# Patient Record
Sex: FEMALE | Race: BLACK OR AFRICAN AMERICAN | Employment: UNEMPLOYED | URBAN - METROPOLITAN AREA
[De-identification: names, ages, dates, MRNs, and addresses within clinical notes are randomized per-mention and may not be internally consistent; named-entity substitution may affect disease eponyms.]

---
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
## 2020-04-13 ENCOUNTER — HOSPITAL ENCOUNTER (EMERGENCY)
Age: 20
Discharge: HOME | End: 2020-04-13
Attending: EMERGENCY MEDICINE
Payer: MEDICAID

## 2020-04-13 VITALS
WEIGHT: 134 LBS | SYSTOLIC BLOOD PRESSURE: 106 MMHG | DIASTOLIC BLOOD PRESSURE: 74 MMHG | BODY MASS INDEX: 23.74 KG/M2 | TEMPERATURE: 98.4 F | HEART RATE: 101 BPM | RESPIRATION RATE: 16 BRPM | HEIGHT: 63 IN | OXYGEN SATURATION: 99 %

## 2020-04-13 DIAGNOSIS — K04.7 DENTAL ABSCESS: Primary | ICD-10-CM

## 2020-04-13 DIAGNOSIS — L03.211 FACIAL CELLULITIS: ICD-10-CM

## 2020-04-13 LAB — HCG UR QL: NEGATIVE

## 2020-04-13 PROCEDURE — 99282 EMERGENCY DEPT VISIT SF MDM: CPT

## 2020-04-13 PROCEDURE — 81025 URINE PREGNANCY TEST: CPT

## 2020-04-13 PROCEDURE — 75810000289 HC I&D ABSCESS SIMP/COMP/MULT

## 2020-04-13 RX ORDER — IBUPROFEN 800 MG/1
800 TABLET ORAL EVERY 8 HOURS
Qty: 15 TAB | Refills: 0 | Status: SHIPPED | OUTPATIENT
Start: 2020-04-13 | End: 2020-04-18

## 2020-04-13 RX ORDER — LIDOCAINE HYDROCHLORIDE 20 MG/ML
5 SOLUTION ORAL; TOPICAL
Qty: 200 ML | Refills: 0 | Status: SHIPPED | OUTPATIENT
Start: 2020-04-13

## 2020-04-13 RX ORDER — CLINDAMYCIN HYDROCHLORIDE 300 MG/1
300 CAPSULE ORAL 4 TIMES DAILY
Qty: 40 CAP | Refills: 0 | Status: SHIPPED | OUTPATIENT
Start: 2020-04-13 | End: 2020-04-23

## 2020-04-13 RX ORDER — PENICILLIN V POTASSIUM 500 MG/1
500 TABLET, FILM COATED ORAL 4 TIMES DAILY
Qty: 40 TAB | Refills: 0 | Status: SHIPPED | OUTPATIENT
Start: 2020-04-13 | End: 2020-04-23

## 2020-04-13 RX ORDER — CHLORHEXIDINE GLUCONATE 1.2 MG/ML
15 RINSE ORAL EVERY 12 HOURS
Qty: 420 ML | Refills: 0 | Status: SHIPPED | OUTPATIENT
Start: 2020-04-13 | End: 2020-04-27

## 2020-04-13 RX ORDER — OXYCODONE AND ACETAMINOPHEN 5; 325 MG/1; MG/1
1 TABLET ORAL
Qty: 10 TAB | Refills: 0 | Status: SHIPPED | OUTPATIENT
Start: 2020-04-13 | End: 2020-04-16

## 2020-04-13 NOTE — ED TRIAGE NOTES
Patient arrived via triage stating that her upper right jaw has been hurting for awhile Patient attempted to remove a cracked tooth by herself but was unable to get all of her tooth out. Patient's right side of her face is now swollen and tender to touch. Patient has taken over the counter pain reliever.

## 2020-04-13 NOTE — ED PROVIDER NOTES
EMERGENCY DEPARTMENT HISTORY AND PHYSICAL EXAM    Date: 4/13/2020  Patient Name: Rey Payne    History of Presenting Illness     Chief Complaint   Patient presents with    Dental Pain    Facial Swelling         History Provided By: Patient    Additional History (Context): Rey Payne is a 23 y.o. female with No significant past medical history who presents with upper right dental pain and facial swelling since yesterday. Denies fever drooling trismus. Does not go to see a dentist.  She is unsure if she is pregnant or not. PCP: None    Current Outpatient Medications   Medication Sig Dispense Refill    penicillin v potassium (VEETID) 500 mg tablet Take 1 Tab by mouth four (4) times daily for 10 days. 40 Tab 0    clindamycin (CLEOCIN) 300 mg capsule Take 1 Cap by mouth four (4) times daily for 10 days. 40 Cap 0    ibuprofen (MOTRIN) 800 mg tablet Take 1 Tab by mouth every eight (8) hours for 5 days. 15 Tab 0    Lidocaine Viscous 2 % solution Take 5 mL by mouth two (2) times daily as needed for Pain. Put 5ml onto guaze and put in mouth; hold for 30min. Can repeat up to twice daily. Indications: administration of local anesthetic drug 200 mL 0    oxyCODONE-acetaminophen (Percocet) 5-325 mg per tablet Take 1 Tab by mouth every six (6) hours as needed for Pain for up to 3 days. Max Daily Amount: 4 Tabs. Take 1 tablet every 6 hours as needed for pain control. 10 Tab 0    chlorhexidine (Peridex) 0.12 % solution 15 mL by Swish and Spit route every twelve (12) hours for 14 days. 420 mL 0    metroNIDAZOLE (FLAGYL) 250 mg tablet Sig:  Take 2 gram by mouth x 1 dose only. 8 Tab 0    ibuprofen (MOTRIN) 600 mg tablet Take 1 Tab by mouth every six (6) hours as needed for Pain. 20 Tab 0    risperiDONE (RISPERDAL) 4 mg tablet Take 5 mg by mouth two (2) times a day.  Indications: BIPOLAR DISORDER IN REMISSION, SCHIZOPHRENIA         Past History     Past Medical History:  Past Medical History:   Diagnosis Date    ADHD (attention deficit hyperactivity disorder)     Anxiety and depression     B[facility] 1 [facility] [address]     Chlamydia 2016    Treated    [facility]        Past Surgical History:  No past surgical history on file. Family History:  Family History   Problem Relation Age of Onset   Brown Stroke Mother     Hypertension Mother     Headache Mother     Seizures Mother     Stroke Maternal Grandmother     Hypertension Maternal Grandmother     Hypertension Maternal Grandfather     Stroke Maternal Grandfather     Stroke Father        Social History:  Social History     Tobacco Use    Smoking status: Current Every Day Smoker    Smokeless tobacco: Never Used   Substance Use Topics    Alcohol use: No    Drug use: No       Allergies: Allergies   Allergen Reactions    Avocado Angioedema         Review of Systems   Review of Systems   Constitutional: Negative for fever. HENT: Positive for dental problem and facial swelling. Negative for drooling. All Other Systems Negative  Physical Exam     Vitals:    04/13/20 1643   BP: 106/74   Pulse: (!) 101   Resp: 16   Temp: 98.4 °F (36.9 °C)   SpO2: 99%   Weight: 60.8 kg (134 lb)   Height: 5' 3\" (1.6 m)     Physical Exam  Vitals signs and nursing note reviewed. Constitutional:       Appearance: She is well-developed. HENT:      Head: Normocephalic and atraumatic. Ears:      Comments: Dental: Second premolar on upper right is eroded tender with adjacent gingival fluctuance palpated and visualized. Moderate to significant facial swelling on the right side. Drooling or trismus. Eyes:      Pupils: Pupils are equal, round, and reactive to light. Neck:      Thyroid: No thyromegaly. Vascular: No JVD. Trachea: No tracheal deviation. Cardiovascular:      Rate and Rhythm: Normal rate and regular rhythm. Heart sounds: Normal heart sounds. No murmur. No friction rub. No gallop.     Pulmonary:      Effort: Pulmonary effort is normal. No respiratory distress. Breath sounds: Normal breath sounds. No stridor. No wheezing or rales. Chest:      Chest wall: No tenderness. Abdominal:      General: There is no distension. Palpations: Abdomen is soft. There is no mass. Tenderness: There is no abdominal tenderness. There is no guarding or rebound. Musculoskeletal:         General: No tenderness. Lymphadenopathy:      Cervical: No cervical adenopathy. Skin:     General: Skin is warm and dry. Coloration: Skin is not pale. Findings: No erythema or rash. Neurological:      Mental Status: She is alert and oriented to person, place, and time. Psychiatric:         Behavior: Behavior normal.         Thought Content: Thought content normal.            Diagnostic Study Results     Labs -     Recent Results (from the past 12 hour(s))   HCG URINE, QL    Collection Time: 04/13/20  4:50 PM   Result Value Ref Range    HCG urine, QL Negative NEG         Radiologic Studies -   No orders to display     CT Results  (Last 48 hours)    None        CXR Results  (Last 48 hours)    None            Medical Decision Making   I am the first provider for this patient. I reviewed the vital signs, available nursing notes, past medical history, past surgical history, family history and social history. Vital Signs-Reviewed the patient's vital signs. Procedures:  I&D Abcess Simple  Date/Time: 4/13/2020 5:08 PM  Performed by: MELITA Lanier  Authorized by: MELITA Lanier     Consent:     Consent obtained:  Verbal    Consent given by:  Patient    Risks discussed:  Pain and incomplete drainage    Alternatives discussed:  Referral  Location:     Type:  Abscess    Size:  1c    Location:  Mouth    Mouth location:  Alveolar process  Anesthesia (see MAR for exact dosages):      Anesthesia method:  None  Procedure type:     Complexity:  Simple  Procedure details:     Incision types:  Stab incision    Drainage:  Purulent and bloody    Drainage amount: Moderate    Packing materials:  None  Post-procedure details:     Patient tolerance of procedure: Tolerated well, no immediate complications        Provider Notes (Medical Decision Making): I&D performed; not pregnant. Treat dental abscess. MED RECONCILIATION:  No current facility-administered medications for this encounter. Current Outpatient Medications   Medication Sig    penicillin v potassium (VEETID) 500 mg tablet Take 1 Tab by mouth four (4) times daily for 10 days.  clindamycin (CLEOCIN) 300 mg capsule Take 1 Cap by mouth four (4) times daily for 10 days.  ibuprofen (MOTRIN) 800 mg tablet Take 1 Tab by mouth every eight (8) hours for 5 days.  Lidocaine Viscous 2 % solution Take 5 mL by mouth two (2) times daily as needed for Pain. Put 5ml onto guaze and put in mouth; hold for 30min. Can repeat up to twice daily. Indications: administration of local anesthetic drug    oxyCODONE-acetaminophen (Percocet) 5-325 mg per tablet Take 1 Tab by mouth every six (6) hours as needed for Pain for up to 3 days. Max Daily Amount: 4 Tabs. Take 1 tablet every 6 hours as needed for pain control.  chlorhexidine (Peridex) 0.12 % solution 15 mL by Swish and Spit route every twelve (12) hours for 14 days.  metroNIDAZOLE (FLAGYL) 250 mg tablet Sig:  Take 2 gram by mouth x 1 dose only.  ibuprofen (MOTRIN) 600 mg tablet Take 1 Tab by mouth every six (6) hours as needed for Pain.  risperiDONE (RISPERDAL) 4 mg tablet Take 5 mg by mouth two (2) times a day. Indications: BIPOLAR DISORDER IN REMISSION, SCHIZOPHRENIA       Disposition:  home    DISCHARGE NOTE:   5:18 PM    Pt has been reexamined. Patient has no new complaints, changes, or physical findings. Care plan outlined and precautions discussed. Results of exam, labs were reviewed with the patient. All medications were reviewed with the patient; will d/c home with see below. All of pt's questions and concerns were addressed.  Patient was instructed and agrees to follow up with dental, as well as to return to the ED upon further deterioration. Patient is ready to go home. Follow-up Information     Follow up With Specialties Details Why [facility]  Schedule an appointment as soon as possible for a visit in 2 days  [address] [facility]  [address] [facility]  Schedule an appointment as soon as possible for a visit in 2 days  [address] [address] [phone]    [facility] EMERGENCY DEPT Emergency Medicine  If symptoms worsen return immediately [address]  [phone]          Current Discharge Medication List      START taking these medications    Details   penicillin v potassium (VEETID) 500 mg tablet Take 1 Tab by mouth four (4) times daily for 10 days. Qty: 40 Tab, Refills: 0      clindamycin (CLEOCIN) 300 mg capsule Take 1 Cap by mouth four (4) times daily for 10 days. Qty: 40 Cap, Refills: 0      !! ibuprofen (MOTRIN) 800 mg tablet Take 1 Tab by mouth every eight (8) hours for 5 days. Qty: 15 Tab, Refills: 0      Lidocaine Viscous 2 % solution Take 5 mL by mouth two (2) times daily as needed for Pain. Put 5ml onto guaze and put in mouth; hold for 30min. Can repeat up to twice daily. Indications: administration of local anesthetic drug  Qty: 200 mL, Refills: 0      oxyCODONE-acetaminophen (Percocet) 5-325 mg per tablet Take 1 Tab by mouth every six (6) hours as needed for Pain for up to 3 days. Max Daily Amount: 4 Tabs. Take 1 tablet every 6 hours as needed for pain control. Qty: 10 Tab, Refills: 0    Associated Diagnoses: Dental abscess      chlorhexidine (Peridex) 0.12 % solution 15 mL by Swish and Spit route every twelve (12) hours for 14 days. Qty: 420 mL, Refills: 0       !! - Potential duplicate medications found. Please discuss with provider.       CONTINUE these medications which have NOT CHANGED Details   !! ibuprofen (MOTRIN) 600 mg tablet Take 1 Tab by mouth every six (6) hours as needed for Pain. Qty: 20 Tab, Refills: 0       !! - Potential duplicate medications found. Please discuss with provider. Clinical Impression:   1. Dental abscess    2.  Facial cellulitis

## 2020-04-13 NOTE — DISCHARGE INSTRUCTIONS
Patient Education        Abscessed Tooth: Care Instructions  Your Care Instructions    An abscessed tooth is a tooth that has a pocket of pus in the tissues around it. Pus forms when the body tries to fight an infection caused by bacteria. If the pus cannot drain, it forms an abscess. An abscessed tooth can cause red, swollen gums and throbbing pain, especially when you chew. You may have a bad taste in your mouth and a fever, and your jaw may swell. Damage to the tooth, untreated tooth decay, or gum disease can cause an abscessed tooth. An abscessed tooth needs to be treated by a dental professional right away. If it is not treated, the infection could spread to other parts of your body. Your dentist will give you antibiotics to stop the infection. He or she may make a hole in the tooth or cut open (joe) the abscess inside your mouth so that the infection can drain, which should relieve your pain. You may need to have a root canal treatment, which tries to save your tooth by taking out the infected pulp and replacing it with a healing medicine and/or a filling. If these treatments do not work, your tooth may have to be removed. Follow-up care is a key part of your treatment and safety. Be sure to make and go to all appointments, and call your doctor if you are having problems. It's also a good idea to know your test results and keep a list of the medicines you take. How can you care for yourself at home? · Reduce pain and swelling in your face and jaw by putting ice or a cold pack on the outside of your cheek. Do this for 10 to 20 minutes at a time. Put a thin cloth between the ice and your skin. · Take pain medicines exactly as directed. ? If the doctor gave you a prescription medicine for pain, take it as prescribed. ? If you are not taking a prescription pain medicine, ask your doctor if you can take an over-the-counter medicine. · Take antibiotics as directed.  Do not stop taking them just because you feel better. You need to take the full course of antibiotics. To prevent tooth abscess  · Brush and floss every day. Have regular dental checkups. · Eat a healthy diet. Avoid sugary foods and drinks. · Do not smoke or vape with nicotine. And don't use spit tobacco. Tobacco and nicotine slow your ability to heal. They increase your risk for gum disease and cancer of the mouth and throat. If you need help quitting, talk to your doctor about stop-smoking programs and medicines. These can increase your chances of quitting for good. When should you call for help? Call 911 anytime you think you may need emergency care. For example, call if:    · You have trouble breathing.    Call your doctor now or seek immediate medical care if:    · You have new or worse symptoms of infection, such as:  ? Increased pain, swelling, warmth, or redness. ? Red streaks leading from the area. ? Pus draining from the area. ? A fever.    Watch closely for changes in your health, and be sure to contact your doctor if:    · You do not get better as expected. Where can you learn more? Go to http://helder-heath.info/  Enter L466 in the search box to learn more about \"Abscessed Tooth: Care Instructions. \"  Current as of: July 28, 2019Content Version: 12.4  © 3626-3381 [employer]. Care instructions adapted under license by [employer] (which disclaims liability or warranty for this information). If you have questions about a medical condition or this instruction, always ask your healthcare professional. [place] [address] any warranty or liability for your use of this information.

## 2021-05-03 ENCOUNTER — HOSPITAL ENCOUNTER (EMERGENCY)
Age: 21
Discharge: HOME | End: 2021-05-03
Attending: EMERGENCY MEDICINE
Payer: COMMERCIAL

## 2021-05-03 VITALS
HEART RATE: 69 BPM | DIASTOLIC BLOOD PRESSURE: 72 MMHG | SYSTOLIC BLOOD PRESSURE: 117 MMHG | TEMPERATURE: 97.8 F | RESPIRATION RATE: 14 BRPM | OXYGEN SATURATION: 99 %

## 2021-05-03 DIAGNOSIS — R10.2 PELVIC PAIN: ICD-10-CM

## 2021-05-03 DIAGNOSIS — K08.89 PAIN, DENTAL: Primary | ICD-10-CM

## 2021-05-03 LAB
AMORPH CRY URNS QL MICRO: ABNORMAL
APPEARANCE UR: ABNORMAL
BACTERIA URNS QL MICRO: ABNORMAL /HPF
BILIRUB UR QL: NEGATIVE
COLOR UR: YELLOW
EPITH CASTS URNS QL MICRO: ABNORMAL /LPF (ref 0–5)
GLUCOSE UR STRIP.AUTO-MCNC: NEGATIVE MG/DL
HCG UR QL: NEGATIVE
HGB UR QL STRIP: ABNORMAL
KETONES UR QL STRIP.AUTO: ABNORMAL MG/DL
LEUKOCYTE ESTERASE UR QL STRIP.AUTO: ABNORMAL
MUCOUS THREADS URNS QL MICRO: ABNORMAL /LPF
NITRITE UR QL STRIP.AUTO: NEGATIVE
PH UR STRIP: 7 [PH] (ref 5–8)
PROT UR STRIP-MCNC: 30 MG/DL
RBC #/AREA URNS HPF: ABNORMAL /HPF (ref 0–5)
SERVICE CMNT-IMP: NORMAL
SP GR UR REFRACTOMETRY: 1.02 (ref 1–1.03)
UROBILINOGEN UR QL STRIP.AUTO: 0.2 EU/DL (ref 0.2–1)
WBC URNS QL MICRO: ABNORMAL /HPF (ref 0–4)
WET PREP GENITAL: NORMAL

## 2021-05-03 PROCEDURE — 87491 CHLMYD TRACH DNA AMP PROBE: CPT

## 2021-05-03 PROCEDURE — 81025 URINE PREGNANCY TEST: CPT

## 2021-05-03 PROCEDURE — 87210 SMEAR WET MOUNT SALINE/INK: CPT

## 2021-05-03 PROCEDURE — 74011250637 HC RX REV CODE- 250/637: Performed by: EMERGENCY MEDICINE

## 2021-05-03 PROCEDURE — 74011250636 HC RX REV CODE- 250/636: Performed by: EMERGENCY MEDICINE

## 2021-05-03 PROCEDURE — 81001 URINALYSIS AUTO W/SCOPE: CPT

## 2021-05-03 PROCEDURE — 96372 THER/PROPH/DIAG INJ SC/IM: CPT

## 2021-05-03 PROCEDURE — 99285 EMERGENCY DEPT VISIT HI MDM: CPT

## 2021-05-03 RX ORDER — DOXYCYCLINE HYCLATE 100 MG
100 TABLET ORAL 2 TIMES DAILY
Qty: 14 TAB | Refills: 0 | Status: SHIPPED | OUTPATIENT
Start: 2021-05-03 | End: 2021-05-10

## 2021-05-03 RX ORDER — DOXYCYCLINE 100 MG/1
100 CAPSULE ORAL
Status: COMPLETED | OUTPATIENT
Start: 2021-05-03 | End: 2021-05-03

## 2021-05-03 RX ORDER — KETOROLAC TROMETHAMINE 15 MG/ML
15 INJECTION, SOLUTION INTRAMUSCULAR; INTRAVENOUS
Status: COMPLETED | OUTPATIENT
Start: 2021-05-03 | End: 2021-05-03

## 2021-05-03 RX ORDER — IBUPROFEN 800 MG/1
800 TABLET ORAL
Qty: 20 TAB | Refills: 0 | Status: SHIPPED | OUTPATIENT
Start: 2021-05-03

## 2021-05-03 RX ADMIN — KETOROLAC TROMETHAMINE 15 MG: 15 INJECTION, SOLUTION INTRAMUSCULAR; INTRAVENOUS at 06:57

## 2021-05-03 RX ADMIN — DOXYCYCLINE HYCLATE 100 MG: 100 CAPSULE ORAL at 07:47

## 2021-05-03 NOTE — LETTER
5/26/2021 Ms. Richiejenny Jenkins [address] [place] [id number] Dear Ms. Jin: 
 
The results of your lab work performed in the ER were abnormal and we have had difficulty reaching you by telephone. Please contact our office as soon as possible to discuss these results.  
 
 
 
Sincerely, 
 
 
MELISSA Trujillo

## 2021-05-03 NOTE — ED NOTES
Provided pt with discharge paperwork and prescriptions. Advised pt of reading material that was given with discharge paperwork. Encouraged pt to stay away from spicy foods and drink plenty of clear fluids. Encouraged patient not to do any recreational drugs or to drink any alcohol. Pt verbally acknowledged understanding of discharge instructions. Pt had no further questions or concerns.

## 2021-05-03 NOTE — ED NOTES
Pt was in bed sleeping. Informed pt that the doctor ordered her some antibiotics and then she will be discharged. Pt is now crying and stating that her head is hurting and that we have done nothing to help her head. I informed the pt that I gave her a shot for her headache earlier. Pt is still stating that her head is hurting and \"it feels like there is a heart beat in my head. \"

## 2021-05-03 NOTE — ED TRIAGE NOTES
Pt arrived with complaint of dental and ear pain that has been going for the past week. Pt is also complaining of abdominal pain that has been for about two weeks.

## 2021-05-03 NOTE — ED PROVIDER NOTES
[facility]  EMERGENCY DEPARTMENT HISTORY AND PHYSICAL EXAM       Date: 5/3/2021   Patient Name: Imelda Schmidt   YOB: 2000  Medical Record Number: [id number]    HISTORY OF PRESENTING ILLNESS:     Imelda Schmidt is a 21 y.o. female presenting with the noted PMH to the ED c/o right upper dental pain for the past 2 days. Worsening tonight. No fevers or chills or difficulty swallowing or shortness of breath. Also reporting lower abdominal pain for the past several days. Last period was about 2 weeks ago when she states she had few days of spotting recently but none currently. Reports white vaginal discharge. Ports pain is lower bilateral and crampy. Does not think she is pregnant. No nausea or vomiting    Rest of complete systems reviewed and negative    Primary Care Provider: None   Specialist:    Past Medical History:   Past Medical History:   Diagnosis Date    ADHD (attention deficit hyperactivity disorder)     Anxiety and depression     [facility] [address]     Chlamydia 2016    Treated    Schizophrenia ([facility]         Past Surgical History:   No past surgical history on file. Social History:   Social History     Tobacco Use    Smoking status: Current Every Day Smoker    Smokeless tobacco: Never Used   Substance Use Topics    Alcohol use: Yes    Drug use: No        Allergies: Allergies   Allergen Reactions    Avocado Angioedema        REVIEW OF SYSTEMS:  Review of Systems   Constitutional: Negative for chills and fever. HENT: Positive for dental problem. Gastrointestinal: Positive for abdominal pain. Negative for nausea and vomiting. Genitourinary: Positive for vaginal discharge. Negative for dysuria. All other systems reviewed and are negative.         PHYSICAL EXAM:  Vitals:    05/03/21 0545 05/03/21 0600 05/03/21 0615 05/03/21 0630   BP: 133/76 (!) 96/54 130/88 (!) 93/58   Pulse: 84 74 100 65   Resp: 12 18 25 18   Temp: 97.8 °F (36.6 °C) SpO2: 100% 100% 100% 100%       Physical Exam  Vitals signs and nursing note reviewed. Constitutional:       General: She is not in acute distress. Appearance: She is well-developed. HENT:      Head: Normocephalic. Mouth/Throat:      Comments: No trismus, right upper dental pain without fluctuance or abscess or swelling  Eyes:      Conjunctiva/sclera: Conjunctivae normal.   Neck:      Musculoskeletal: Neck supple. Cardiovascular:      Rate and Rhythm: Normal rate. Pulses: Normal pulses. Pulmonary:      Effort: Pulmonary effort is normal.   Abdominal:      General: There is no distension. Palpations: Abdomen is soft. Tenderness: There is no abdominal tenderness. Comments: No significant abdominal tenderness   Genitourinary:     Comments: Mild white discharge with mild cervical motion tenderness, no bleeding, chaperoned by RN  Musculoskeletal: Normal range of motion. Skin:     General: Skin is warm and dry. Neurological:      Mental Status: She is alert and oriented to person, place, and time. Mental status is at baseline.             Medications   cefTRIAXone (ROCEPHIN) 500 mg in lidocaine (PF) (XYLOCAINE) 10 mg/mL (1 %) IM injection (has no administration in time range)   doxycycline (VIBRAMYCIN) capsule 100 mg (has no administration in time range)   ketorolac (TORADOL) injection 15 mg (15 mg IntraMUSCular Given 5/3/21 0657)       RESULTS:    Labs -   Labs Reviewed   URINALYSIS W/ RFLX MICROSCOPIC - Abnormal; Notable for the following components:       Result Value    Protein 30 (*)     Ketone TRACE (*)     Blood TRACE (*)     Leukocyte Esterase TRACE (*)     All other components within normal limits   URINE MICROSCOPIC ONLY - Abnormal; Notable for the following components:    Bacteria 3+ (*)     Mucus 1+ (*)     Amorphous Crystals 2+ (*)     All other components within normal limits   WET PREP   HCG URINE, QL   CHLAMYDIA/NEISSERIA AMPLIFICATION       Radiologic Studies -  No results found. MEDICAL DECISION MAKING  15-year-old female dental and pelvic pain. Right upper dental pain without evidence of fluctuance or abscess or drainage. Floor of mouth is soft and no trismus. Also with lower bilateral abdominal pelvic pain. Abdomen is benign. Mild CMT and discharged on pelvic exam.  Will treat for PID. Doubt intra-abdominal process such as appendicitis or torsion given benign abdomen and bilateral pain. Follow-up with dental clinic      Patient has no new complaints, changes, or physical findings. Results were reviewed with the patient. Pt's questions and concerns were addressed. Care plan was outlined, including follow-up with PCP/specialist and return precautions were discussed. Patient is felt to be stable for discharge at this time. Diagnosis   Clinical Impression:   1. Pain, dental    2. Pelvic pain           Follow-up Information     Follow up With Specialties Details Why [address]  Call  for follow up [facility]  [phone]          Current Discharge Medication List      START taking these medications    Details   doxycycline (VIBRA-TABS) 100 mg tablet Take 1 Tab by mouth two (2) times a day for 7 days. Qty: 14 Tab, Refills: 0      !! ibuprofen (MOTRIN) 800 mg tablet Take 1 Tab by mouth every six (6) hours as needed for Pain. Qty: 20 Tab, Refills: 0       !! - Potential duplicate medications found. Please discuss with provider. CONTINUE these medications which have NOT CHANGED    Details   !! ibuprofen (MOTRIN) 600 mg tablet Take 1 Tab by mouth every six (6) hours as needed for Pain. Qty: 20 Tab, Refills: 0       !! - Potential duplicate medications found. Please discuss with provider. Discharged in stable and improved condition. This chart was completed using Dragon, a dictation transcription service. Errors may have resulted from using this device.

## 2021-05-06 LAB
C TRACH RRNA SPEC QL NAA+PROBE: POSITIVE
N GONORRHOEA RRNA SPEC QL NAA+PROBE: POSITIVE
PLEASE NOTE:, 188601: ABNORMAL
SPECIMEN SOURCE: ABNORMAL

## 2021-05-07 NOTE — PROGRESS NOTES
Called, unable to leave a voicemail. Patient needs treatment for gonorrheagiven doxycycline prescription for chlamydia. Needs notification and return for Rocephin injection.

## 2021-05-20 NOTE — PROGRESS NOTES
Called patient to notify of positive chlamydia and gonorrhea results. Her mother answered the phone. HIPAA compliant message left for callback.

## 2021-12-22 ENCOUNTER — HOSPITAL ENCOUNTER (EMERGENCY)
Age: 21
Discharge: HOME | End: 2021-12-22
Attending: STUDENT IN AN ORGANIZED HEALTH CARE EDUCATION/TRAINING PROGRAM
Payer: COMMERCIAL

## 2021-12-22 ENCOUNTER — APPOINTMENT (OUTPATIENT)
Dept: GENERAL RADIOLOGY | Age: 21
End: 2021-12-22
Attending: NURSE PRACTITIONER
Payer: COMMERCIAL

## 2021-12-22 VITALS
HEIGHT: 63 IN | OXYGEN SATURATION: 100 % | RESPIRATION RATE: 16 BRPM | HEART RATE: 88 BPM | BODY MASS INDEX: 26.58 KG/M2 | TEMPERATURE: 98.5 F | WEIGHT: 150 LBS

## 2021-12-22 DIAGNOSIS — S93.601A SPRAIN OF RIGHT FOOT, INITIAL ENCOUNTER: ICD-10-CM

## 2021-12-22 DIAGNOSIS — A59.01 TRICHOMONAS VAGINITIS: Primary | ICD-10-CM

## 2021-12-22 LAB
HCG UR QL: NEGATIVE
SERVICE CMNT-IMP: NORMAL
WET PREP GENITAL: NORMAL

## 2021-12-22 PROCEDURE — 96372 THER/PROPH/DIAG INJ SC/IM: CPT

## 2021-12-22 PROCEDURE — 74011000250 HC RX REV CODE- 250: Performed by: NURSE PRACTITIONER

## 2021-12-22 PROCEDURE — 74011250637 HC RX REV CODE- 250/637: Performed by: NURSE PRACTITIONER

## 2021-12-22 PROCEDURE — 99282 EMERGENCY DEPT VISIT SF MDM: CPT

## 2021-12-22 PROCEDURE — 73630 X-RAY EXAM OF FOOT: CPT

## 2021-12-22 PROCEDURE — 87591 N.GONORRHOEAE DNA AMP PROB: CPT

## 2021-12-22 PROCEDURE — 87210 SMEAR WET MOUNT SALINE/INK: CPT

## 2021-12-22 PROCEDURE — 81025 URINE PREGNANCY TEST: CPT

## 2021-12-22 PROCEDURE — 74011250636 HC RX REV CODE- 250/636: Performed by: NURSE PRACTITIONER

## 2021-12-22 RX ORDER — DOXYCYCLINE 100 MG/1
100 CAPSULE ORAL
Status: COMPLETED | OUTPATIENT
Start: 2021-12-22 | End: 2021-12-22

## 2021-12-22 RX ORDER — METRONIDAZOLE 500 MG/1
2000 TABLET ORAL
Status: COMPLETED | OUTPATIENT
Start: 2021-12-22 | End: 2021-12-22

## 2021-12-22 RX ORDER — DOXYCYCLINE HYCLATE 100 MG
100 TABLET ORAL 2 TIMES DAILY
Qty: 14 TABLET | Refills: 0 | Status: SHIPPED | OUTPATIENT
Start: 2021-12-22 | End: 2021-12-29

## 2021-12-22 RX ORDER — DOXYCYCLINE HYCLATE 100 MG
100 TABLET ORAL 2 TIMES DAILY
Qty: 14 TABLET | Refills: 0 | Status: SHIPPED | OUTPATIENT
Start: 2021-12-22 | End: 2021-12-22 | Stop reason: SDUPTHER

## 2021-12-22 RX ADMIN — LIDOCAINE HYDROCHLORIDE 500 MG: 10 INJECTION, SOLUTION EPIDURAL; INFILTRATION; INTRACAUDAL; PERINEURAL at 16:53

## 2021-12-22 RX ADMIN — METRONIDAZOLE 2000 MG: 500 TABLET ORAL at 16:54

## 2021-12-22 RX ADMIN — DOXYCYCLINE HYCLATE 100 MG: 100 CAPSULE ORAL at 16:54

## 2021-12-22 NOTE — Clinical Note
[facility] EMERGENCY DEPT  [address] [address] [phone]    Work/School Note    Date: 12/22/2021    To Whom It May concern:    Maico Das was seen and treated today in the emergency room by the following provider(s):  Attending Provider: Claudeen Arch, MD  Nurse Practitioner: MELISSA Juárez. Maico Das is excused from work/school on 12/22/21 and 12/23/21. She is medically clear to return to work/school on 12/24/2021.        Sincerely,          MELISSA Lucas

## 2021-12-22 NOTE — Clinical Note
[facility] EMERGENCY DEPT  [address] [address] [phone]    Work/School Note    Date: 12/22/2021    To Whom It May concern:    Fani Vanegas was seen and treated today in the emergency room by the following provider(s):  Attending Provider: Gonzalez Tracy MD  Nurse Practitioner: MELISSA Soriano. Fani Vanegas is excused from work/school on 12/22/21 and 12/23/21. She is medically clear to return to work/school on 12/24/2021.        Sincerely,          MELISSA Morales

## 2021-12-22 NOTE — ED TRIAGE NOTES
Sprained right hallux 1 week ago. Ambulatory; however, has difficulty baring weight on right foot evenly.

## 2021-12-22 NOTE — Clinical Note
[facility] EMERGENCY DEPT  [address] [address] [phone]    Work/School Note    Date: 12/22/2021    To Whom It May concern:    Adriane Sandhoff was seen and treated today in the emergency room by the following provider(s):  Attending Provider: Ermelinda Epley, MD  Nurse Practitioner: MELISSA Belcher. Adriane Sandhoff is excused from work/school on 12/22/21 and 12/23/21. She is medically clear to return to work/school on 12/24/2021.        Sincerely,          MELISSA Esteban

## 2021-12-22 NOTE — ED PROVIDER NOTES
EMERGENCY DEPARTMENT HISTORY AND PHYSICAL EXAM    Date: 12/22/2021  Patient Name: Rondel Merlin    History of Presenting Illness     Chief Complaint   Patient presents with    Toe Pain       History Provided By: patient    Additional History (Context): Rondel Merlin is a 80-year-old female with past medical history significant for ADHD, anxiety, bipolar disorder, and schizophrenia who presents to the ER with complaints of vaginal discharge and odor for the last couple days. She states she just recently finished her menstrual cycle. She has not had any recent unprotected sex but would like to be tested for STDs. She is also complaining of pain in the right foot, great toe. She states this happened about a week ago when she sprained it while at a party. PCP: None    Current Facility-Administered Medications   Medication Dose Route Frequency Provider Last Rate Last Admin    metroNIDAZOLE (FLAGYL) tablet 2,000 mg  2,000 mg Oral NOW Rey Kearney FNP        cefTRIAXone (ROCEPHIN) 500 mg in lidocaine (PF) (XYLOCAINE) 10 mg/mL (1 %) IM injection  500 mg IntraMUSCular NOW Cleophas Raúl, FNP        doxycycline (VIBRAMYCIN) capsule 100 mg  100 mg Oral NOW Cleophedwin Olsen, FNP         Current Outpatient Medications   Medication Sig Dispense Refill    doxycycline (VIBRA-TABS) 100 mg tablet Take 1 Tablet by mouth two (2) times a day for 7 days. 14 Tablet 0    ibuprofen (MOTRIN) 800 mg tablet Take 1 Tab by mouth every six (6) hours as needed for Pain. 20 Tab 0    Lidocaine Viscous 2 % solution Take 5 mL by mouth two (2) times daily as needed for Pain. Put 5ml onto guaze and put in mouth; hold for 30min. Can repeat up to twice daily. Indications: administration of local anesthetic drug 200 mL 0    metroNIDAZOLE (FLAGYL) 250 mg tablet Sig:  Take 2 gram by mouth x 1 dose only. 8 Tab 0    ibuprofen (MOTRIN) 600 mg tablet Take 1 Tab by mouth every six (6) hours as needed for Pain.  20 Tab 0    risperiDONE (RISPERDAL) 4 mg tablet Take 5 mg by mouth two (2) times a day. Indications: BIPOLAR DISORDER IN REMISSION, SCHIZOPHRENIA         Past History     Past Medical History:  Past Medical History:   Diagnosis Date    ADHD (attention deficit hyperactivity disorder)     Anxiety and depression     Bipolar 1 disorder ([facility] [address]     Chlamydia 2016    Treated    Schizophrenia ([facility] [address]        Past Surgical History:  No past surgical history on file. Family History:  Family History   Problem Relation Age of Onset   [facility] Stroke Mother     Hypertension Mother     Headache Mother     Seizures Mother     Stroke Maternal Grandmother     Hypertension Maternal Grandmother     Hypertension Maternal Grandfather     Stroke Maternal Grandfather     Stroke Father        Social History:  Social History     Tobacco Use    Smoking status: Current Every Day Smoker    Smokeless tobacco: Never Used   Substance Use Topics    Alcohol use: Yes    Drug use: No       Allergies: Allergies   Allergen Reactions    Avocado Angioedema         Review of Systems     Review of Systems   Constitutional: Negative for chills and fever. HENT: Negative for nasal congestion, sore throat, rhinorrhea  Eyes: Negative. Respiratory: Negative cough and negative for shortness of breath. Cardiovascular: Negative for chest pain and palpitations. Gastrointestinal: Negative for abdominal pain, constipation, diarrhea, nausea and vomiting. Genitourinary: Positive for vaginal discharge. Negative for vaginal bleeding. Negative for difficulty urinating and flank pain. Musculoskeletal: Positive for right foot pain. Negative for back pain. Negative for gait problem and neck pain. Skin: Negative. Allergic/Immunologic: Negative. Neurological: Negative for dizziness, weakness, numbness and headaches. Psychiatric/Behavioral: Negative. All other systems reviewed and are negative.   All Other Systems Negative    Physical Exam     Vitals: 12/22/21 1457   Pulse: 88   Resp: 16   Temp: 98.5 °F (36.9 °C)   SpO2: 100%   Weight: 68 kg (150 lb)   Height: 5' 3\" (1.6 m)     Physical Exam  Vitals and nursing note reviewed. Constitutional:       General: She is not in acute distress. Appearance: Normal appearance. She is not ill-appearing, toxic-appearing or diaphoretic. HENT:      Head: Normocephalic and atraumatic. Nose: Nose normal.   Eyes:      General: Lids are normal. Vision grossly intact. Conjunctiva/sclera: Conjunctivae normal.      Pupils: Pupils are equal, round, and reactive to light. Cardiovascular:      Rate and Rhythm: Normal rate and regular rhythm. Pulmonary:      Effort: Pulmonary effort is normal.      Breath sounds: Normal breath sounds. Abdominal:      General: There is no distension. Palpations: Abdomen is soft. Tenderness: There is no abdominal tenderness. There is no right CVA tenderness, left CVA tenderness or guarding. Musculoskeletal:         General: Normal range of motion. Cervical back: Full passive range of motion without pain and normal range of motion. Skin:     General: Skin is warm and dry. Capillary Refill: Capillary refill takes less than 2 seconds. Neurological:      General: No focal deficit present. Mental Status: She is alert and oriented to person, place, and time. Psychiatric:         Mood and Affect: Mood normal.         Behavior: Behavior normal. Behavior is cooperative.            Diagnostic Study Results     Labs -     Recent Results (from the past 12 hour(s))   WET PREP    Collection Time: 12/22/21  3:00 PM    Specimen: Vagina   Result Value Ref Range    Special Requests: NO SPECIAL REQUESTS      Wet prep FEW  MOTILE TRICHOMONAS NOTED        Wet prep FEW  CLUE CELLS PRESENT        Wet prep NO YEAST SEEN     HCG URINE, QL    Collection Time: 12/22/21  3:00 PM   Result Value Ref Range    HCG urine, QL Negative NEG         Radiologic Studies -   XR FOOT RT MIN 3 V   Final Result   1. No acute osseous findings. CT Results  (Last 48 hours)    None        CXR Results  (Last 48 hours)    None            Medical Decision Making   I am the first provider for this patient. I reviewed the vital signs, available nursing notes, past medical history, past surgical history, family history and social history. Vital Signs-Reviewed the patient's vital signs. Records Reviewed: Nursing notes, old medical records and any previous labs, imaging, visits, consultations pertinent to patient care    Procedures:  Procedures      ED Course: Progress Notes, Reevaluation, and Consults:  4:49 PM discussed results with patient including STD of trichomonas. She will be treated empirically here for gonorrhea and chlamydia and will also be given Flagyl 2 g for trichomonas and will also cover for BV. I also discussed negative x-ray of her foot and diagnosis of foot sprain. Discussed supportive treatment at home. Follow-up with [address] [place] [address] STD clinic. All sexual partners will need to be tested and treated as well. Provider Notes (Medical Decision Making):   19-year-old female here with vaginal discharge. Vital signs are stable and exam is unremarkable. No concern for PID. No indication for pelvic exam and patient is deferring the need for one at this time. hCG negative for pregnancy. Wet prep positive for trichomoniasis and clue cells. Gonorrhea and chlamydia cultures sent, pending. Patient also complaining of right foot pain and an x-ray was obtained. She is neurovascularly intact. D/c home with gyn and pcp f/u.      MED RECONCILIATION:  Current Facility-Administered Medications   Medication Dose Route Frequency    metroNIDAZOLE (FLAGYL) tablet 2,000 mg  2,000 mg Oral NOW    cefTRIAXone (ROCEPHIN) 500 mg in lidocaine (PF) (XYLOCAINE) 10 mg/mL (1 %) IM injection  500 mg IntraMUSCular NOW    doxycycline (VIBRAMYCIN) capsule 100 mg  100 mg Oral NOW     Current Outpatient Medications   Medication Sig    doxycycline (VIBRA-TABS) 100 mg tablet Take 1 Tablet by mouth two (2) times a day for 7 days.  ibuprofen (MOTRIN) 800 mg tablet Take 1 Tab by mouth every six (6) hours as needed for Pain.  Lidocaine Viscous 2 % solution Take 5 mL by mouth two (2) times daily as needed for Pain. Put 5ml onto guaze and put in mouth; hold for 30min. Can repeat up to twice daily. Indications: administration of local anesthetic drug    metroNIDAZOLE (FLAGYL) 250 mg tablet Sig:  Take 2 gram by mouth x 1 dose only.  ibuprofen (MOTRIN) 600 mg tablet Take 1 Tab by mouth every six (6) hours as needed for Pain.  risperiDONE (RISPERDAL) 4 mg tablet Take 5 mg by mouth two (2) times a day. Indications: BIPOLAR DISORDER IN REMISSION, SCHIZOPHRENIA       Disposition:  home    DISCHARGE NOTE:     Pt has been reexamined. Patient has no new complaints, changes, or physical findings. Care plan outlined and precautions discussed. Discussed proper way to take medications. Discussed treatment plan, return precautions, symptomatic relief, and expected time to improvement. All questions answered. Patient is stable for discharge and outpatient management. Patient is ready to go home. Follow-up Information     Follow up With Specialties Details Why [address] [place] [address]  Schedule an appointment as soon as possible for a visit  Follow-up from the Emergency Department [address] [facility] EMERGENCY DEPT Emergency Medicine  As needed, If symptoms worsen [address] [facility] [address]  [phone]          Current Discharge Medication List      START taking these medications    Details   doxycycline (VIBRA-TABS) 100 mg tablet Take 1 Tablet by mouth two (2) times a day for 7 days.   Qty: 14 Tablet, Refills: 0  Start date: 12/22/2021, End date: 12/29/2021                   Diagnosis     Clinical Impression:   1. Trichomonas vaginitis    2. Sprain of right foot, initial encounter          Dictation disclaimer:  Please note that this dictation was completed with [employer], the computer voice recognition software. Quite often unanticipated grammatical, syntax, homophones, and other interpretive errors are inadvertently transcribed by the computer software. Please disregard these errors. Please excuse any errors that have escaped final proofreading.

## 2021-12-23 LAB
C TRACH RRNA SPEC QL NAA+PROBE: NEGATIVE
N GONORRHOEA RRNA SPEC QL NAA+PROBE: NEGATIVE
SPECIMEN SOURCE: NORMAL

## 2022-03-18 PROBLEM — R10.9 ABDOMINAL PAIN: Status: ACTIVE | Noted: 2017-07-29

## 2022-03-20 PROBLEM — Z34.00 PREGNANCY, NORMAL FIRST: Status: ACTIVE | Noted: 2017-08-18

## 2023-01-29 ENCOUNTER — HOSPITAL ENCOUNTER (EMERGENCY)
Age: 23
Discharge: HOME | End: 2023-01-29
Attending: STUDENT IN AN ORGANIZED HEALTH CARE EDUCATION/TRAINING PROGRAM
Payer: COMMERCIAL

## 2023-01-29 ENCOUNTER — APPOINTMENT (OUTPATIENT)
Dept: CT IMAGING | Age: 23
End: 2023-01-29
Attending: EMERGENCY MEDICINE
Payer: COMMERCIAL

## 2023-01-29 VITALS
RESPIRATION RATE: 18 BRPM | HEART RATE: 93 BPM | SYSTOLIC BLOOD PRESSURE: 110 MMHG | OXYGEN SATURATION: 100 % | DIASTOLIC BLOOD PRESSURE: 69 MMHG | TEMPERATURE: 98.5 F

## 2023-01-29 DIAGNOSIS — N20.0 KIDNEY STONE: ICD-10-CM

## 2023-01-29 DIAGNOSIS — R93.5 ABNORMAL CT OF THE ABDOMEN: ICD-10-CM

## 2023-01-29 DIAGNOSIS — N39.0 ACUTE UTI: ICD-10-CM

## 2023-01-29 DIAGNOSIS — N13.4 HYDROURETER ON LEFT: Primary | ICD-10-CM

## 2023-01-29 LAB
ALBUMIN SERPL-MCNC: 4.5 G/DL (ref 3.4–5)
ALBUMIN/GLOB SERPL: 1.1 (ref 0.8–1.7)
ALP SERPL-CCNC: 82 U/L (ref 45–117)
ALT SERPL-CCNC: 30 U/L (ref 13–56)
ANION GAP SERPL CALC-SCNC: 6 MMOL/L (ref 3–18)
APPEARANCE UR: ABNORMAL
AST SERPL-CCNC: 23 U/L (ref 10–38)
BACTERIA URNS QL MICRO: ABNORMAL /HPF
BASOPHILS # BLD: 0 K/UL (ref 0–0.1)
BASOPHILS NFR BLD: 0 % (ref 0–2)
BILIRUB SERPL-MCNC: 0.4 MG/DL (ref 0.2–1)
BILIRUB UR QL: NEGATIVE
BUN SERPL-MCNC: 10 MG/DL (ref 7–18)
BUN/CREAT SERPL: 14 (ref 12–20)
CALCIUM SERPL-MCNC: 9.7 MG/DL (ref 8.5–10.1)
CHLORIDE SERPL-SCNC: 108 MMOL/L (ref 100–111)
CO2 SERPL-SCNC: 22 MMOL/L (ref 21–32)
COLOR UR: YELLOW
CREAT SERPL-MCNC: 0.74 MG/DL (ref 0.6–1.3)
DIFFERENTIAL METHOD BLD: ABNORMAL
EOSINOPHIL # BLD: 0 K/UL (ref 0–0.4)
EOSINOPHIL NFR BLD: 0 % (ref 0–5)
EPITH CASTS URNS QL MICRO: ABNORMAL /LPF (ref 0–5)
ERYTHROCYTE [DISTWIDTH] IN BLOOD BY AUTOMATED COUNT: 13.8 % (ref 11.6–14.5)
GLOBULIN SER CALC-MCNC: 4 G/DL (ref 2–4)
GLUCOSE SERPL-MCNC: 195 MG/DL (ref 74–99)
GLUCOSE UR STRIP.AUTO-MCNC: NEGATIVE MG/DL
HCG SERPL QL: NEGATIVE
HCT VFR BLD AUTO: 42.3 % (ref 35–45)
HGB BLD-MCNC: 14.1 G/DL (ref 12–16)
HGB UR QL STRIP: ABNORMAL
IMM GRANULOCYTES # BLD AUTO: 0.1 K/UL (ref 0–0.04)
IMM GRANULOCYTES NFR BLD AUTO: 1 % (ref 0–0.5)
KETONES UR QL STRIP.AUTO: 80 MG/DL
LEUKOCYTE ESTERASE UR QL STRIP.AUTO: ABNORMAL
LIPASE SERPL-CCNC: 64 U/L (ref 73–393)
LYMPHOCYTES # BLD: 2.1 K/UL (ref 0.9–3.6)
LYMPHOCYTES NFR BLD: 13 % (ref 21–52)
MCH RBC QN AUTO: 28.7 PG (ref 24–34)
MCHC RBC AUTO-ENTMCNC: 33.3 G/DL (ref 31–37)
MCV RBC AUTO: 86.2 FL (ref 78–100)
MONOCYTES # BLD: 0.6 K/UL (ref 0.05–1.2)
MONOCYTES NFR BLD: 4 % (ref 3–10)
NEUTS SEG # BLD: 13.1 K/UL (ref 1.8–8)
NEUTS SEG NFR BLD: 83 % (ref 40–73)
NITRITE UR QL STRIP.AUTO: NEGATIVE
NRBC # BLD: 0 K/UL (ref 0–0.01)
NRBC BLD-RTO: 0 PER 100 WBC
PH UR STRIP: 5.5 (ref 5–8)
PLATELET # BLD AUTO: 346 K/UL (ref 135–420)
PMV BLD AUTO: 10 FL (ref 9.2–11.8)
POTASSIUM SERPL-SCNC: 3.4 MMOL/L (ref 3.5–5.5)
PROT SERPL-MCNC: 8.5 G/DL (ref 6.4–8.2)
PROT UR STRIP-MCNC: 30 MG/DL
RBC # BLD AUTO: 4.91 M/UL (ref 4.2–5.3)
RBC #/AREA URNS HPF: ABNORMAL /HPF (ref 0–5)
SODIUM SERPL-SCNC: 136 MMOL/L (ref 136–145)
SP GR UR REFRACTOMETRY: 1.02 (ref 1–1.03)
UROBILINOGEN UR QL STRIP.AUTO: 0.2 EU/DL (ref 0.2–1)
WBC # BLD AUTO: 15.9 K/UL (ref 4.6–13.2)
WBC URNS QL MICRO: ABNORMAL /HPF (ref 0–4)

## 2023-01-29 PROCEDURE — 83690 ASSAY OF LIPASE: CPT

## 2023-01-29 PROCEDURE — 99284 EMERGENCY DEPT VISIT MOD MDM: CPT

## 2023-01-29 PROCEDURE — 96375 TX/PRO/DX INJ NEW DRUG ADDON: CPT

## 2023-01-29 PROCEDURE — 87086 URINE CULTURE/COLONY COUNT: CPT

## 2023-01-29 PROCEDURE — 84703 CHORIONIC GONADOTROPIN ASSAY: CPT

## 2023-01-29 PROCEDURE — 85025 COMPLETE CBC W/AUTO DIFF WBC: CPT

## 2023-01-29 PROCEDURE — 74176 CT ABD & PELVIS W/O CONTRAST: CPT

## 2023-01-29 PROCEDURE — 81001 URINALYSIS AUTO W/SCOPE: CPT

## 2023-01-29 PROCEDURE — 96374 THER/PROPH/DIAG INJ IV PUSH: CPT

## 2023-01-29 PROCEDURE — 74011250636 HC RX REV CODE- 250/636: Performed by: EMERGENCY MEDICINE

## 2023-01-29 PROCEDURE — 80053 COMPREHEN METABOLIC PANEL: CPT

## 2023-01-29 RX ORDER — TAMSULOSIN HYDROCHLORIDE 0.4 MG/1
0.4 CAPSULE ORAL DAILY
Qty: 15 CAPSULE | Refills: 0 | Status: SHIPPED | OUTPATIENT
Start: 2023-01-29 | End: 2023-02-13

## 2023-01-29 RX ORDER — CEPHALEXIN 500 MG/1
1000 CAPSULE ORAL 2 TIMES DAILY
Qty: 28 CAPSULE | Refills: 0 | Status: SHIPPED | OUTPATIENT
Start: 2023-01-29 | End: 2023-02-05

## 2023-01-29 RX ORDER — ONDANSETRON 2 MG/ML
4 INJECTION INTRAMUSCULAR; INTRAVENOUS
Status: COMPLETED | OUTPATIENT
Start: 2023-01-29 | End: 2023-01-29

## 2023-01-29 RX ORDER — CEFTRIAXONE 1 G/1
1 INJECTION, POWDER, FOR SOLUTION INTRAMUSCULAR; INTRAVENOUS
Status: DISCONTINUED | OUTPATIENT
Start: 2023-01-29 | End: 2023-01-29 | Stop reason: HOSPADM

## 2023-01-29 RX ORDER — TAMSULOSIN HYDROCHLORIDE 0.4 MG/1
0.4 CAPSULE ORAL ONCE
Status: DISCONTINUED | OUTPATIENT
Start: 2023-01-29 | End: 2023-01-29 | Stop reason: HOSPADM

## 2023-01-29 RX ORDER — KETOROLAC TROMETHAMINE 10 MG/1
10 TABLET, FILM COATED ORAL
Qty: 10 TABLET | Refills: 0 | Status: SHIPPED | OUTPATIENT
Start: 2023-01-29

## 2023-01-29 RX ORDER — ONDANSETRON 8 MG/1
8 TABLET, ORALLY DISINTEGRATING ORAL
Qty: 12 TABLET | Refills: 0 | Status: SHIPPED | OUTPATIENT
Start: 2023-01-29

## 2023-01-29 RX ORDER — KETOROLAC TROMETHAMINE 15 MG/ML
15 INJECTION, SOLUTION INTRAMUSCULAR; INTRAVENOUS ONCE
Status: COMPLETED | OUTPATIENT
Start: 2023-01-29 | End: 2023-01-29

## 2023-01-29 RX ADMIN — ONDANSETRON 4 MG: 2 INJECTION INTRAMUSCULAR; INTRAVENOUS at 11:22

## 2023-01-29 RX ADMIN — KETOROLAC TROMETHAMINE 15 MG: 15 INJECTION, SOLUTION INTRAMUSCULAR; INTRAVENOUS at 11:22

## 2023-01-29 RX ADMIN — SODIUM CHLORIDE 1000 ML: 9 INJECTION, SOLUTION INTRAVENOUS at 11:22

## 2023-01-29 NOTE — ED TRIAGE NOTES
Pt. Arrives to the ED endorsing LLQ pain that radiates to the flank area. Pt. Denies hx of kidney stones.

## 2023-01-29 NOTE — DISCHARGE INSTRUCTIONS
You have been evaluated today for your left flank pain. You have a small kidney stone but you also have a small urinary tract infection. You have also been found to have concerning CT findings for possibility of underlying kidney disease. You have an elevated white count and you have been vomiting. Your pain and your nausea seem to be controlled and you do not have a fever so we are sending you home as you have requested. I spoke with urology who agrees with the plan for very close follow-up. Please be advised that if you begin to feel any worse and includes vomiting through the medication spiking a fever you need to return to the emergency department immediately.

## 2023-01-29 NOTE — ED PROVIDER NOTES
EMERGENCY DEPARTMENT HISTORY AND PHYSICAL EXAM    Date: 1/29/2023  Patient Name: Krystyna Mondragon    History of Presenting Illness     Chief Complaint   Patient presents with    Abdominal Pain         History Provided By: Patient        Additional History (Context): Krystyna Mondragon is a 25 y.o. female with No significant past medical history who presents with complaint of generalized abdominal pain and left flank pain radiating to her suprapubic area since this morning. Nauseous vomiting. Denies prior history or family history of kidney stones prior abdominal surgeries hematuria vaginal bleeding or possibility of pregnancy. Denies prior similar symptoms. Denies fever. Smokes like 5 cigarettes a day and drinks infrequently on social occasions. Denies illicits. PCP: None    Current Facility-Administered Medications   Medication Dose Route Frequency Provider Last Rate Last Admin    cefTRIAXone (ROCEPHIN) injection 1 g  1 g IntraVENous NOW Juana Villarreal PA        tamsulosin (FLOMAX) capsule 0.4 mg  0.4 mg Oral ONCE Juana Villarreal PA         Current Outpatient Medications   Medication Sig Dispense Refill    cephALEXin (Keflex) 500 mg capsule Take 2 Capsules by mouth two (2) times a day for 7 days. 28 Capsule 0    ketorolac (TORADOL) 10 mg tablet Take 1 Tablet by mouth every six (6) hours as needed for Pain for up to 10 doses. 10 Tablet 0    tamsulosin (Flomax) 0.4 mg capsule Take 1 Capsule by mouth daily for 15 days. 15 Capsule 0    ondansetron (ZOFRAN ODT) 8 mg disintegrating tablet Take 1 Tablet by mouth every eight (8) hours as needed for Nausea or Vomiting. 12 Tablet 0    ibuprofen (MOTRIN) 800 mg tablet Take 1 Tab by mouth every six (6) hours as needed for Pain. 20 Tab 0    Lidocaine Viscous 2 % solution Take 5 mL by mouth two (2) times daily as needed for Pain. Put 5ml onto guaze and put in mouth; hold for 30min. Can repeat up to twice daily.   Indications: administration of local anesthetic drug 200 mL 0 metroNIDAZOLE (FLAGYL) 250 mg tablet Sig:  Take 2 gram by mouth x 1 dose only. 8 Tab 0    ibuprofen (MOTRIN) 600 mg tablet Take 1 Tab by mouth every six (6) hours as needed for Pain. 20 Tab 0    risperiDONE (RISPERDAL) 4 mg tablet Take 5 mg by mouth two (2) times a day. Indications: BIPOLAR DISORDER IN REMISSION, SCHIZOPHRENIA         Past History     Past Medical History:  Past Medical History:   Diagnosis Date    ADHD (attention deficit hyperactivity disorder)     Anxiety and depression     Bipolar 1 disorder ([facility] [address]     Chlamydia 2016    Treated    [facility]        Past Surgical History:  No past surgical history on file. Family History:  Family History   Problem Relation Age of Onset    Stroke Mother     Hypertension Mother     Headache Mother     Seizures Mother     Stroke Maternal Grandmother     Hypertension Maternal Grandmother     Hypertension Maternal Grandfather     Stroke Maternal Grandfather     Stroke Father        Social History:  Social History     Tobacco Use    Smoking status: Every Day    Smokeless tobacco: Never   Substance Use Topics    Alcohol use: Yes    Drug use: No       Allergies: Allergies   Allergen Reactions    Avocado Angioedema         Review of Systems   Review of Systems   Constitutional:  Negative for fever. HENT: Negative. Eyes: Negative. Respiratory: Negative. Cardiovascular: Negative. Gastrointestinal:  Positive for abdominal pain, nausea and vomiting. Endocrine: Negative. Genitourinary:  Positive for flank pain. Skin: Negative. Allergic/Immunologic: Negative. Neurological: Negative. Hematological: Negative. Psychiatric/Behavioral: Negative. All Other Systems Negative  Physical Exam     Vitals:    01/29/23 0956   BP: 102/73   Pulse: 91   Resp: 16   Temp: 98.4 °F (36.9 °C)   SpO2: 100%     Physical Exam  Vitals and nursing note reviewed. Constitutional:       Appearance: She is well-developed.    HENT:      Head: Normocephalic and atraumatic. Right Ear: External ear normal.      Left Ear: External ear normal.      Nose: Nose normal.   Eyes:      Conjunctiva/sclera: Conjunctivae normal.      Pupils: Pupils are equal, round, and reactive to light. Neck:      Vascular: No JVD. Trachea: No tracheal deviation. Cardiovascular:      Rate and Rhythm: Normal rate and regular rhythm. Heart sounds: Normal heart sounds. No murmur heard. No friction rub. No gallop. Pulmonary:      Effort: Pulmonary effort is normal. No respiratory distress. Breath sounds: Normal breath sounds. No wheezing or rales. Abdominal:      General: Bowel sounds are normal. There is no distension. Palpations: Abdomen is soft. There is no mass. Tenderness: There is generalized abdominal tenderness. There is left CVA tenderness. There is no guarding or rebound. Musculoskeletal:         General: No tenderness. Normal range of motion. Cervical back: Normal range of motion and neck supple. Skin:     General: Skin is warm and dry. Findings: No rash. Neurological:      Mental Status: She is alert and oriented to person, place, and time. Cranial Nerves: No cranial nerve deficit. Deep Tendon Reflexes: Reflexes are normal and symmetric.    Psychiatric:         Behavior: Behavior normal.            Diagnostic Study Results     Labs -     Recent Results (from the past 12 hour(s))   CBC WITH AUTOMATED DIFF    Collection Time: 01/29/23 10:52 AM   Result Value Ref Range    WBC 15.9 (H) 4.6 - 13.2 K/uL    RBC 4.91 4.20 - 5.30 M/uL    HGB 14.1 12.0 - 16.0 g/dL    HCT 42.3 35.0 - 45.0 %    MCV 86.2 78.0 - 100.0 FL    MCH 28.7 24.0 - 34.0 PG    MCHC 33.3 31.0 - 37.0 g/dL    RDW 13.8 11.6 - 14.5 %    PLATELET 693 314 - 511 K/uL    MPV 10.0 9.2 - 11.8 FL    NRBC 0.0 0  WBC    ABSOLUTE NRBC 0.00 0.00 - 0.01 K/uL    NEUTROPHILS 83 (H) 40 - 73 %    LYMPHOCYTES 13 (L) 21 - 52 %    MONOCYTES 4 3 - 10 %    EOSINOPHILS 0 0 - 5 % BASOPHILS 0 0 - 2 %    IMMATURE GRANULOCYTES 1 (H) 0.0 - 0.5 %    ABS. NEUTROPHILS 13.1 (H) 1.8 - 8.0 K/UL    ABS. LYMPHOCYTES 2.1 0.9 - 3.6 K/UL    ABS. MONOCYTES 0.6 0.05 - 1.2 K/UL    ABS. EOSINOPHILS 0.0 0.0 - 0.4 K/UL    ABS. BASOPHILS 0.0 0.0 - 0.1 K/UL    ABS. IMM. GRANS. 0.1 (H) 0.00 - 0.04 K/UL    DF AUTOMATED     METABOLIC PANEL, COMPREHENSIVE    Collection Time: 01/29/23 10:52 AM   Result Value Ref Range    Sodium 136 136 - 145 mmol/L    Potassium 3.4 (L) 3.5 - 5.5 mmol/L    Chloride 108 100 - 111 mmol/L    CO2 22 21 - 32 mmol/L    Anion gap 6 3.0 - 18 mmol/L    Glucose 195 (H) 74 - 99 mg/dL    BUN 10 7.0 - 18 MG/DL    Creatinine 0.74 0.6 - 1.3 MG/DL    BUN/Creatinine ratio 14 12 - 20      eGFR >60 >60 ml/min/1.73m2    Calcium 9.7 8.5 - 10.1 MG/DL    Bilirubin, total 0.4 0.2 - 1.0 MG/DL    ALT (SGPT) 30 13 - 56 U/L    AST (SGOT) 23 10 - 38 U/L    Alk.  phosphatase 82 45 - 117 U/L    Protein, total 8.5 (H) 6.4 - 8.2 g/dL    Albumin 4.5 3.4 - 5.0 g/dL    Globulin 4.0 2.0 - 4.0 g/dL    A-G Ratio 1.1 0.8 - 1.7     LIPASE    Collection Time: 01/29/23 10:52 AM   Result Value Ref Range    Lipase 64 (L) 73 - 393 U/L   HCG QL SERUM    Collection Time: 01/29/23 10:52 AM   Result Value Ref Range    HCG, Ql. Negative NEG     URINALYSIS W/ RFLX MICROSCOPIC    Collection Time: 01/29/23 11:10 AM   Result Value Ref Range    Color YELLOW      Appearance CLOUDY      Specific gravity 1.023 1.005 - 1.030      pH (UA) 5.5 5.0 - 8.0      Protein 30 (A) NEG mg/dL    Glucose Negative NEG mg/dL    Ketone 80 (A) NEG mg/dL    Bilirubin Negative NEG      Blood MODERATE (A) NEG      Urobilinogen 0.2 0.2 - 1.0 EU/dL    Nitrites Negative NEG      Leukocyte Esterase SMALL (A) NEG     URINE MICROSCOPIC ONLY    Collection Time: 01/29/23 11:10 AM   Result Value Ref Range    WBC 4 to 10 0 - 4 /hpf    RBC 3 to 4 0 - 5 /hpf    Epithelial cells 4+ 0 - 5 /lpf    Bacteria FEW (A) NEG /hpf       Radiologic Studies -   CT ABD PELV WO CONT   Final Result      Mild dilatation of the left pyelocalyceal collecting system with a dilated   rbkcgbhk-si-ikb ureter which is seen to the upper lateral wall of the pelvis. The intervening ureter to the small fluid-filled bladder is not well seen but   there is a tiny asymmetrical calcification which may be along the expected   course of the ureter and represent a possibly partially obstructing distal   ureteral calculus. There is a tiny nonobstructing calculus at the upper pole of the left kidney. There is some evidence of focal hazy increased density in both kidneys which   appears to be pyramidal, more apparent on the right than the left. Medullary   sponge kidney may be a tentative possibility. The bowel is unremarkable and the appendix is not dilated but there is some   calcific density near the tip which may represent mural calcification or   possible appendicolith(s). The remainder of the study is normal.      Note: These findings were discussed with MELITA Espinal, with readback at 1245   on 29 January 2023.         ===================   Note: [facility] maintains that all CT scans at their facilities   are performed by using dose optimization technique as appropriate to a performed   examination, to include automated exposure control, adjustment of the mAs and/or   kVp according to patient size (including appropriate matching for site specific   examinations) or use of iterative reconstruction technique. CT Results  (Last 48 hours)                 01/29/23 1200  CT ABD PELV WO CONT Final result    Impression:      Mild dilatation of the left pyelocalyceal collecting system with a dilated   xelmayiz-qv-lqk ureter which is seen to the upper lateral wall of the pelvis.     The intervening ureter to the small fluid-filled bladder is not well seen but   there is a tiny asymmetrical calcification which may be along the expected   course of the ureter and represent a possibly partially obstructing distal   ureteral calculus. There is a tiny nonobstructing calculus at the upper pole of the left kidney. There is some evidence of focal hazy increased density in both kidneys which   appears to be pyramidal, more apparent on the right than the left. Medullary   sponge kidney may be a tentative possibility. The bowel is unremarkable and the appendix is not dilated but there is some   calcific density near the tip which may represent mural calcification or   possible appendicolith(s). The remainder of the study is normal.       Note: These findings were discussed with MELITA Bernal, with readback at 1245   on 29 January 2023.           ===================   Note: [facility] maintains that all CT scans at their facilities   are performed by using dose optimization technique as appropriate to a performed   examination, to include automated exposure control, adjustment of the mAs and/or   kVp according to patient size (including appropriate matching for site specific   examinations) or use of iterative reconstruction technique. Narrative:  EXAM: CT ABDOMEN/PELVIS  CPT code: 10570, [id number]       CLINICAL INDICATION/HISTORY: Abdominal pain. COMPARISON: None. TECHNIQUE: Helical axial non-contrast images of the abdomen and pelvis are   obtained from the domes of the diaphragm to the ischia. FINDINGS:    CT Abdomen: The visualized portion of the lung bases is unremarkable. No focal   abnormalities are seen in the solid abdominal viscera. There may be a small 1.3   cm splenule at the posterolateral inferior tip of the spleen. No biliary   abnormalities are seen. The right kidney, there is no hydronephrosis or   specific nephrolithiasis; however, there are some foci of hazy increased density   and appears to be parenchymal in location.   In the left kidney somewhat similar   opacities are seen and there is mild dilatation of the pyelocalyceal collecting   system. There is a tiny nonobstructing calculus at the upper pole. The ureter   is identified more distally in the abdomen and appears to be dilated up to about   1 cm. The visualized portion of the bowel is normal.  There is no significant   retroperitoneal lymphadenopathy. No significant vascular abnormalities are   seen. The bones and soft tissues are unremarkable. CT Pelvis: The visualized portion of the bowel is unremarkable. The appendix is   identified along the lateral pelvic wall from a cecum apparently in the deep   pelvis. The terminal end of the appendix appears hyperdense and may contain   some calcium/appendicoliths. There is no significant dilatation. No   significant adenopathy is seen. The uterus looks normal.  No significant   adnexal abnormalities are seen. The dilated distal ureter is seen to the upper   lateral pelvic wall. It is somewhat lost in the adnexal soft tissues. The   bladder is not well filled but is otherwise unremarkable and is seen mainly just   behind the symphysis pubis. Just superior to the posterolateral corner of the   bladder, there is a tiny calculus which may be in the expected location of the   ureter although the ureter is not definitely seen. The bones and soft tissues   are unremarkable. CXR Results  (Last 48 hours)      None              Medical Decision Making   I am the first provider for this patient. I reviewed the vital signs, available nursing notes, past medical history, past surgical history, family history and social history. Vital Signs-Reviewed the patient's vital signs. Records Reviewed: None    Procedures:  Procedures    Provider Notes (Medical Decision Making): Patient has small urinary tract infection as well as nausea vomiting leukocytosis and an abnormal CT scan confirming for small stone as well as hydroureter and possibility of medullary sponge disease.   All these findings were discussed with urology who agrees that since patient is adamant that she is not staying she can be discharged with very close follow-up. Have reiterated with patient findings above as well as need to return to the emergency department for any worsening of her symptoms and to have close follow-up with urology within 1 day. MED RECONCILIATION:  Current Facility-Administered Medications   Medication Dose Route Frequency    cefTRIAXone (ROCEPHIN) injection 1 g  1 g IntraVENous NOW    tamsulosin (FLOMAX) capsule 0.4 mg  0.4 mg Oral ONCE     Current Outpatient Medications   Medication Sig    cephALEXin (Keflex) 500 mg capsule Take 2 Capsules by mouth two (2) times a day for 7 days. ketorolac (TORADOL) 10 mg tablet Take 1 Tablet by mouth every six (6) hours as needed for Pain for up to 10 doses. tamsulosin (Flomax) 0.4 mg capsule Take 1 Capsule by mouth daily for 15 days. ondansetron (ZOFRAN ODT) 8 mg disintegrating tablet Take 1 Tablet by mouth every eight (8) hours as needed for Nausea or Vomiting. ibuprofen (MOTRIN) 800 mg tablet Take 1 Tab by mouth every six (6) hours as needed for Pain. Lidocaine Viscous 2 % solution Take 5 mL by mouth two (2) times daily as needed for Pain. Put 5ml onto guaze and put in mouth; hold for 30min. Can repeat up to twice daily. Indications: administration of local anesthetic drug    metroNIDAZOLE (FLAGYL) 250 mg tablet Sig:  Take 2 gram by mouth x 1 dose only. ibuprofen (MOTRIN) 600 mg tablet Take 1 Tab by mouth every six (6) hours as needed for Pain. risperiDONE (RISPERDAL) 4 mg tablet Take 5 mg by mouth two (2) times a day. Indications: BIPOLAR DISORDER IN REMISSION, SCHIZOPHRENIA       Disposition:  home    DISCHARGE NOTE:   1:08 PM    Pt has been reexamined. Patient has no new complaints, changes, or physical findings. Care plan outlined and precautions discussed. Results of labs, CT were reviewed with the patient.  All medications were reviewed with the patient; will d/c home with flomax, keflex, toradol, zofran. All of pt's questions and concerns were addressed. Patient was instructed and agrees to follow up with urology, as well as to return to the ED upon further deterioration. Patient is ready to go home. Follow-up Information       Follow up With Specialties Details Why Contact Info    Given, Cornelio Cushing, MD Urology Schedule an appointment as soon as possible for a visit in 1 day  [address] [place] [address] [facility] EMERGENCY DEPT Emergency Medicine  If symptoms worsen return immediately [address]  [phone]            Current Discharge Medication List        START taking these medications    Details   cephALEXin (Keflex) 500 mg capsule Take 2 Capsules by mouth two (2) times a day for 7 days. Qty: 28 Capsule, Refills: 0  Start date: 1/29/2023, End date: 2/5/2023      ketorolac (TORADOL) 10 mg tablet Take 1 Tablet by mouth every six (6) hours as needed for Pain for up to 10 doses. Qty: 10 Tablet, Refills: 0  Start date: 1/29/2023      tamsulosin (Flomax) 0.4 mg capsule Take 1 Capsule by mouth daily for 15 days. Qty: 15 Capsule, Refills: 0  Start date: 1/29/2023, End date: 2/13/2023      ondansetron (ZOFRAN ODT) 8 mg disintegrating tablet Take 1 Tablet by mouth every eight (8) hours as needed for Nausea or Vomiting. Qty: 12 Tablet, Refills: 0  Start date: 1/29/2023             Diagnosis     Clinical Impression:   1. Hydroureter on left    2. Kidney stone    3. Acute UTI    4.  Abnormal CT of the abdomen

## 2023-01-30 LAB
BACTERIA SPEC CULT: NORMAL
SERVICE CMNT-IMP: NORMAL

## 2023-04-01 ENCOUNTER — APPOINTMENT (OUTPATIENT)
Facility: HOSPITAL | Age: 23
End: 2023-04-01
Payer: COMMERCIAL

## 2023-04-01 ENCOUNTER — HOSPITAL ENCOUNTER (EMERGENCY)
Facility: HOSPITAL | Age: 23
Discharge: HOME | End: 2023-04-01
Attending: EMERGENCY MEDICINE
Payer: COMMERCIAL

## 2023-04-01 VITALS
DIASTOLIC BLOOD PRESSURE: 67 MMHG | HEART RATE: 108 BPM | RESPIRATION RATE: 16 BRPM | OXYGEN SATURATION: 100 % | TEMPERATURE: 98.1 F | SYSTOLIC BLOOD PRESSURE: 113 MMHG

## 2023-04-01 DIAGNOSIS — A59.9 TRICHIMONIASIS: ICD-10-CM

## 2023-04-01 DIAGNOSIS — B37.9 YEAST INFECTION: ICD-10-CM

## 2023-04-01 DIAGNOSIS — O26.899 ABDOMINAL PAIN DURING INTRAUTERINE PREGNANCY: Primary | ICD-10-CM

## 2023-04-01 DIAGNOSIS — R10.9 ABDOMINAL PAIN DURING INTRAUTERINE PREGNANCY: Primary | ICD-10-CM

## 2023-04-01 LAB
APPEARANCE UR: CLEAR
BACTERIA URNS QL MICRO: ABNORMAL /HPF
BILIRUB UR QL: NEGATIVE
COLOR UR: YELLOW
EPITH CASTS URNS QL MICRO: ABNORMAL /LPF (ref 0–5)
GLUCOSE UR STRIP.AUTO-MCNC: NEGATIVE MG/DL
HCG SERPL-ACNC: ABNORMAL MIU/ML (ref 0–10)
HCG UR QL: POSITIVE
HGB UR QL STRIP: NEGATIVE
KETONES UR QL STRIP.AUTO: NEGATIVE MG/DL
LEUKOCYTE ESTERASE UR QL STRIP.AUTO: ABNORMAL
NITRITE UR QL STRIP.AUTO: NEGATIVE
PH UR STRIP: 6.5 (ref 5–8)
PROT UR STRIP-MCNC: NEGATIVE MG/DL
RBC #/AREA URNS HPF: NEGATIVE /HPF (ref 0–5)
SERVICE CMNT-IMP: NORMAL
SP GR UR REFRACTOMETRY: 1.01 (ref 1–1.03)
TRICHOMONAS UR QL MICRO: ABNORMAL
UROBILINOGEN UR QL STRIP.AUTO: 1 EU/DL (ref 0.2–1)
WBC URNS QL MICRO: ABNORMAL /HPF (ref 0–4)
WET PREP GENITAL: NORMAL

## 2023-04-01 PROCEDURE — 87210 SMEAR WET MOUNT SALINE/INK: CPT

## 2023-04-01 PROCEDURE — 76801 OB US < 14 WKS SINGLE FETUS: CPT

## 2023-04-01 PROCEDURE — 99284 EMERGENCY DEPT VISIT MOD MDM: CPT

## 2023-04-01 PROCEDURE — 81025 URINE PREGNANCY TEST: CPT

## 2023-04-01 PROCEDURE — 84702 CHORIONIC GONADOTROPIN TEST: CPT

## 2023-04-01 PROCEDURE — 81001 URINALYSIS AUTO W/SCOPE: CPT

## 2023-04-01 PROCEDURE — 87491 CHLMYD TRACH DNA AMP PROBE: CPT

## 2023-04-01 ASSESSMENT — ENCOUNTER SYMPTOMS
VOMITING: 0
ABDOMINAL DISTENTION: 0
WHEEZING: 0
SHORTNESS OF BREATH: 0
DIARRHEA: 0
SORE THROAT: 0
NAUSEA: 0
EYE DISCHARGE: 0

## 2023-04-01 NOTE — ED PROVIDER NOTES
EMERGENCY DEPARTMENT HISTORY AND PHYSICAL EXAM    Date: 4/1/2023  Patient Name: Shaye Dean    History of Presenting Illness     Chief Complaint   Patient presents with    Abdominal Pain     Possibly 11 weeks pregnant. Does not have a OBGYN  Denies vaginal bleeding. Endorsing white discharge. History Provided By: Patient      Additional History (Context): Shaye Dean is a 25 y.o. female with a history of bipolar disorder, schizophrenia and chlamydia who presents today for pelvic pain and white vaginal discharge. Patient reports based off her last menstrual cycle she think she is roughly 11 weeks pregnant. Is taking a prenatal vitamin but does not currently have an OB/GYN. Is waiting for her Medicaid to come through before she can follow-up. Denies concern for STD      PCP: None None    No current facility-administered medications for this encounter. Current Outpatient Medications   Medication Sig Dispense Refill    ibuprofen (ADVIL;MOTRIN) 600 MG tablet Take 600 mg by mouth every 6 hours as needed      ibuprofen (ADVIL;MOTRIN) 800 MG tablet Take 800 mg by mouth every 6 hours as needed      lidocaine viscous hcl (XYLOCAINE) 2 % SOLN solution Take 5 mLs by mouth 2 times daily as needed      metroNIDAZOLE (FLAGYL) 250 MG tablet Sig:  Take 2 gram by mouth x 1 dose only. risperiDONE (RISPERDAL) 4 MG tablet Take 5 mg by mouth 2 times daily         Past History     Past Medical History:  Past Medical History:   Diagnosis Date    ADHD (attention deficit hyperactivity disorder)     Anxiety and depression     Bipolar 1 disorder ([facility] [address]     Chlamydia 2016    Treated    Schizophrenia ([facility] [address]        Past Surgical History:  No past surgical history on file.     Family History:  Family History   Problem Relation Age of Onset    Stroke Maternal Grandmother     Headache Mother     Hypertension Mother     Stroke Mother     Seizures Mother     Hypertension Maternal Grandmother     Hypertension Maternal signs, available nursing notes, past medical history, past surgical history, family history and social history. Vital Signs-Reviewed the patient's vital signs. Records Reviewed: Nursing Notes and Old Medical Records     Procedures: None   Procedures    Provider Notes (Medical Decision Making):     Differential Diagnosis:  Menses, AUB/DUB, , normal pregnancy, STD, pelvic infection, ectopic pregnancy    Plan: We will order wet prep, GC swab, UA, urine pregnancy and beta quant if positive will order ultrasound        ED Course as of 23 1401   Sat 2023   1254 Urine preg is positive, will order US   [CS]   1400 Have discussed ultrasound findings with patient and have given her a copy of her results. Did stressed the importance of prenatal care and she states that soon as her insurance kicks in she will follow-up. Have given her follow-up referral.  Encouraged her to continue taking her prenatal vitamins. Discussed findings of yeast infection and trichomonas with patient however she states \"I am already taking care of that\". Denies need for any additional medications. Did discuss with her that she could be at risk for miscarriage if she does not treat these infections appropriately. She states understanding. Will discharge home. [CS]      ED Course User Index  [CS] CAMERON Ibrahim         MED RECONCILIATION:  No current facility-administered medications for this encounter. Current Outpatient Medications   Medication Sig    ibuprofen (ADVIL;MOTRIN) 600 MG tablet Take 600 mg by mouth every 6 hours as needed    ibuprofen (ADVIL;MOTRIN) 800 MG tablet Take 800 mg by mouth every 6 hours as needed    lidocaine viscous hcl (XYLOCAINE) 2 % SOLN solution Take 5 mLs by mouth 2 times daily as needed    metroNIDAZOLE (FLAGYL) 250 MG tablet Sig:  Take 2 gram by mouth x 1 dose only.     risperiDONE (RISPERDAL) 4 MG tablet Take 5 mg by mouth 2 times daily       Disposition:  Home     DISCHARGE

## 2023-04-01 NOTE — ED TRIAGE NOTES
Possibly 11 weeks pregnant. Does not have a OBGYN  Denies vaginal bleeding. Endorsing white discharge.

## 2024-10-22 NOTE — ED TRIAGE NOTES
Patient arrived via code green c/o vaginal bleeding. Patient states she was seen yesterday at [facility] for same issue and was told to return if symptoms persisted. Patient informed of OB services in area for possible transfer. Patient states she will just leave and have family take her to [facility]. Patient got up from stretcher and walked out of ER.  MD informed patient of risk leaving N/A